# Patient Record
Sex: FEMALE | Race: BLACK OR AFRICAN AMERICAN | NOT HISPANIC OR LATINO
[De-identification: names, ages, dates, MRNs, and addresses within clinical notes are randomized per-mention and may not be internally consistent; named-entity substitution may affect disease eponyms.]

---

## 2019-11-05 PROBLEM — Z00.00 ENCOUNTER FOR PREVENTIVE HEALTH EXAMINATION: Status: ACTIVE | Noted: 2019-11-05

## 2019-11-12 ENCOUNTER — APPOINTMENT (OUTPATIENT)
Dept: ORTHOPEDIC SURGERY | Facility: CLINIC | Age: 34
End: 2019-11-12
Payer: OTHER MISCELLANEOUS

## 2019-11-12 VITALS — BODY MASS INDEX: 27.62 KG/M2 | HEIGHT: 67 IN | WEIGHT: 176 LBS

## 2019-11-12 PROCEDURE — 73562 X-RAY EXAM OF KNEE 3: CPT | Mod: 50

## 2019-11-12 PROCEDURE — 99204 OFFICE O/P NEW MOD 45 MIN: CPT

## 2019-11-12 NOTE — HISTORY OF PRESENT ILLNESS
[de-identified] : C/O RIGHT KNEE PAIN AND SWELLING - WORKERS COMP CASE. PATIENT SLIPPED ON A WET FLOOR AT A [employer].\par \par SENT FOR NEW XRAYS TODAY.Patient was told she had a knee sprain she is here because of continued right knee medial as well as lateral pain. It is slowly improving but still causes discomfort. Patient is not return to work pending clearance.

## 2019-11-12 NOTE — DISCUSSION/SUMMARY
[de-identified] : Patient's clinical exam and history SYSTEM with a sprain grade 1 of the medial collateral ligament of the right knee. Patient was placed on Celebrex 200 mg p.o. twice a day for 6 days and then as needed she'll follow up with us in one weeks time for reevaluation possible return to work at that point the patient is not improved at that point MRI of the right he may be warranted.

## 2019-11-12 NOTE — PHYSICAL EXAM
[de-identified] : Right knee on exam today patient is deaf and medial tenderness the knee in the area of the origin and insertion of the medial collateral ligament she also has lateral joint line tenderness. Range of motion of the left knee 0-145 of the right knee is 0-125 limited by anterior discomfort. She is neurovascularly intact distally there is minimal warmth minimal soft tissue swelling about the left knee. [de-identified] : AP standing individual lateral and sunrise views are obtained of both knees showing well preserved cartilage space is normal 3 compartments of the right knee. No bony abnormalities noted there is no spurring noted mesh of cartilage height on standing AP projection. No evidence of fracture.

## 2019-11-14 ENCOUNTER — OTHER (OUTPATIENT)
Age: 34
End: 2019-11-14

## 2019-11-14 RX ORDER — CELECOXIB 200 MG/1
200 CAPSULE ORAL TWICE DAILY
Qty: 60 | Refills: 2 | Status: ACTIVE | COMMUNITY
Start: 2019-11-14 | End: 1900-01-01

## 2019-11-19 ENCOUNTER — APPOINTMENT (OUTPATIENT)
Dept: ORTHOPEDIC SURGERY | Facility: CLINIC | Age: 34
End: 2019-11-19
Payer: OTHER MISCELLANEOUS

## 2019-11-19 DIAGNOSIS — M25.561 PAIN IN RIGHT KNEE: ICD-10-CM

## 2019-11-19 PROCEDURE — 99214 OFFICE O/P EST MOD 30 MIN: CPT

## 2019-11-19 NOTE — PHYSICAL EXAM
[de-identified] : Patient continues to have tenderness in the area of the medial collateral ligament origin and insertion. There is no instability to valgus stress and full extension however she is neurovascularly intact. There is noticeable swelling in the area.Patient is some quadriceps atrophy of the right quad as compared to the left

## 2019-11-19 NOTE — DISCUSSION/SUMMARY
[de-identified] : We talked at length about options. I believe the patient be best served by going to physical therapy to strengthen her quadriceps are giving her a Genutrain brace for support to be worn while out of the house. She will be kept out of work until reassessment in 2 weeks' time. We're anticipate return for work at that point.

## 2019-11-21 ENCOUNTER — OTHER (OUTPATIENT)
Age: 34
End: 2019-11-21

## 2019-12-05 ENCOUNTER — APPOINTMENT (OUTPATIENT)
Dept: ORTHOPEDIC SURGERY | Facility: CLINIC | Age: 34
End: 2019-12-05
Payer: OTHER MISCELLANEOUS

## 2019-12-05 VITALS — HEIGHT: 67 IN | BODY MASS INDEX: 27.62 KG/M2 | WEIGHT: 176 LBS

## 2019-12-05 PROCEDURE — 99214 OFFICE O/P EST MOD 30 MIN: CPT

## 2019-12-05 NOTE — PHYSICAL EXAM
[de-identified] : Patient has no tenderness in the area of the medial collateral ligament of the right knee today. Range of motion is 0-135° there is no instability noted to valgus stress with full extension as well as 90° of flexion. She is neurovascularly intact distally. Quad tone is somewhat minimally atrophied on the right compared to the left her

## 2019-12-05 NOTE — HISTORY OF PRESENT ILLNESS
[de-identified] : Patient is here for followup status post right knee medial collateral ligament injury. This was a work-related injury. Patient is here to be evaluated for return to work. She states she feels much improved she herself believe she is ready to return to work with no restrictions.

## 2019-12-05 NOTE — DISCUSSION/SUMMARY
[de-identified] : Patient I both agree she is able to return to work with no restrictions immediately. The patient was instructed how to do exercises to improve further a right quadriceps strength and tone although it is not significantly atrophied. She will follow me after new years if there are any issues with her knee.